# Patient Record
Sex: FEMALE | Race: WHITE | Employment: PART TIME | URBAN - METROPOLITAN AREA
[De-identification: names, ages, dates, MRNs, and addresses within clinical notes are randomized per-mention and may not be internally consistent; named-entity substitution may affect disease eponyms.]

---

## 2018-01-17 PROCEDURE — 87624 HPV HI-RISK TYP POOLED RSLT: CPT | Performed by: OBSTETRICS & GYNECOLOGY

## 2018-01-17 PROCEDURE — 88175 CYTOPATH C/V AUTO FLUID REDO: CPT | Performed by: OBSTETRICS & GYNECOLOGY

## 2019-11-07 ENCOUNTER — HOSPITAL ENCOUNTER (OUTPATIENT)
Age: 64
Discharge: HOME | End: 2019-11-07
Attending: FAMILY MEDICINE
Payer: COMMERCIAL

## 2019-11-07 VITALS
WEIGHT: 95 LBS | DIASTOLIC BLOOD PRESSURE: 66 MMHG | SYSTOLIC BLOOD PRESSURE: 115 MMHG | HEART RATE: 70 BPM | OXYGEN SATURATION: 99 % | TEMPERATURE: 98 F | HEIGHT: 64.75 IN | RESPIRATION RATE: 16 BRPM | BODY MASS INDEX: 16.02 KG/M2

## 2019-11-07 DIAGNOSIS — H00.014 HORDEOLUM EXTERNUM OF LEFT UPPER EYELID: Primary | ICD-10-CM

## 2019-11-07 PROCEDURE — 99204 OFFICE O/P NEW MOD 45 MIN: CPT

## 2019-11-07 PROCEDURE — 99203 OFFICE O/P NEW LOW 30 MIN: CPT

## 2019-11-07 RX ORDER — GENTAMICIN SULFATE 3 MG/ML
2 SOLUTION/ DROPS OPHTHALMIC 3 TIMES DAILY
Qty: 1 BOTTLE | Refills: 0 | Status: SHIPPED | OUTPATIENT
Start: 2019-11-07 | End: 2019-11-14

## 2019-11-07 NOTE — ED PROVIDER NOTES
Patient presents with:  Eye Problem    HPI:     Hilario Napoles is a 60year old female who presents today with a chief complaint of painful tender lump on left upper eyelid. Onset: spontaneous, for  3 days.    Injury: No  Foreign body: No  Redness: No midline. Mucosa normal. No drainage or sinus tenderness. . No nasal flaring  Tonsils are clear no exudates bilaterally. Moist mucous membranes. Neck: supple. No adenopathy. No thyromegaly. Heart: RRR without S3 or S4 murmur .  Clear S1S2  Lungs: clear to a